# Patient Record
Sex: FEMALE
[De-identification: names, ages, dates, MRNs, and addresses within clinical notes are randomized per-mention and may not be internally consistent; named-entity substitution may affect disease eponyms.]

---

## 2022-05-31 ENCOUNTER — APPOINTMENT (OUTPATIENT)
Dept: OTOLARYNGOLOGY | Facility: CLINIC | Age: 77
End: 2022-05-31
Payer: MEDICARE

## 2022-05-31 VITALS
SYSTOLIC BLOOD PRESSURE: 154 MMHG | DIASTOLIC BLOOD PRESSURE: 98 MMHG | BODY MASS INDEX: 31.65 KG/M2 | WEIGHT: 190 LBS | TEMPERATURE: 97.6 F | HEIGHT: 65 IN | HEART RATE: 68 BPM

## 2022-05-31 DIAGNOSIS — Z78.9 OTHER SPECIFIED HEALTH STATUS: ICD-10-CM

## 2022-05-31 DIAGNOSIS — Z87.891 PERSONAL HISTORY OF NICOTINE DEPENDENCE: ICD-10-CM

## 2022-05-31 DIAGNOSIS — Z82.3 FAMILY HISTORY OF STROKE: ICD-10-CM

## 2022-05-31 DIAGNOSIS — Z82.49 FAMILY HISTORY OF ISCHEMIC HEART DISEASE AND OTHER DISEASES OF THE CIRCULATORY SYSTEM: ICD-10-CM

## 2022-05-31 PROCEDURE — 31575 DIAGNOSTIC LARYNGOSCOPY: CPT

## 2022-05-31 PROCEDURE — 99204 OFFICE O/P NEW MOD 45 MIN: CPT | Mod: 25

## 2022-05-31 RX ORDER — FLUTICASONE PROPIONATE 50 UG/1
50 SPRAY, METERED NASAL DAILY
Qty: 1 | Refills: 5 | Status: ACTIVE | COMMUNITY
Start: 2022-05-31 | End: 1900-01-01

## 2022-05-31 RX ORDER — CLINDAMYCIN PHOSPHATE 10 MG/ML
1 SOLUTION TOPICAL
Qty: 30 | Refills: 0 | Status: ACTIVE | COMMUNITY
Start: 2021-12-06

## 2022-05-31 RX ORDER — FAMOTIDINE 20 MG/1
20 TABLET, FILM COATED ORAL TWICE DAILY
Qty: 60 | Refills: 4 | Status: ACTIVE | COMMUNITY
Start: 2022-05-31 | End: 1900-01-01

## 2022-05-31 RX ORDER — POLYETHYLENE GLYCOL 3350 17 G/17G
17 POWDER, FOR SOLUTION ORAL
Qty: 238 | Refills: 0 | Status: ACTIVE | COMMUNITY
Start: 2022-04-08

## 2022-05-31 NOTE — REVIEW OF SYSTEMS
[Hoarseness] : hoarseness [Throat Clearing] : throat clearing [Throat Dryness] : throat dryness [Throat Itching] : throat itching [Negative] : Heme/Lymph [Patient Intake Form Reviewed] : Patient intake form was reviewed

## 2022-05-31 NOTE — HISTORY OF PRESENT ILLNESS
[de-identified] : past year co raspy voice, occasionall better\par no excessive voice use, no dysphagia no reflux\par co nasal congestion and pnd excessive throat clearing\par neg tobac

## 2022-05-31 NOTE — PROCEDURE
[de-identified] : Indication for procedure:  Unable to examine laryngeal structures with mirror exam, persistent hoarseness\par The patient has\par \par Scope # \par Topical anesthesia with viscous xylocaine 2% is applied to the anterior nares.\par A flexible fiberoptic laryngoscope is than introduced through the nares.\par The nasopharynx is clear without mass or inflammation.\par The posterior pharyngeal wall is unremarkable.\par The tongue base and vallecula are unremarkable.  The hypopharynx id clear and unobstructed.\par The supraglottic larynx is within normal limits.\par Both vocal cords are fully mobile with mild diffuse bilateral vocal cord edema.  \par No Nodules or other lesions are noted.\par There is no edema or erythema overlying the arytenoid cartilages or the inter-arytenoid space.\par The subglottic space is clear.\par The voice has a mild raspy quality.\par \par

## 2022-05-31 NOTE — ASSESSMENT
[FreeTextEntry1] : pnd rhinitis\par mild vc hyperplasia\par suspect laryngea reflux\par fluticasone \par famotidine 20 bid\par Reviewed with patient source of throat discomfort, cough and throat clearing related to gastric reflux entering throat and back of larynx.\par Reviewed with patient reflux diet handout with lifestyle and dietary suggestions to reduce reflux.\par fu 4 weeks\par \par \par

## 2022-06-29 ENCOUNTER — APPOINTMENT (OUTPATIENT)
Dept: OTOLARYNGOLOGY | Facility: CLINIC | Age: 77
End: 2022-06-29

## 2022-06-29 VITALS — TEMPERATURE: 97.2 F | HEIGHT: 65 IN | BODY MASS INDEX: 31.65 KG/M2 | WEIGHT: 190 LBS

## 2022-06-29 PROCEDURE — 99214 OFFICE O/P EST MOD 30 MIN: CPT | Mod: 25

## 2022-06-29 RX ORDER — IPRATROPIUM BROMIDE 42 UG/1
0.06 SPRAY NASAL 3 TIMES DAILY
Qty: 1 | Refills: 6 | Status: ACTIVE | COMMUNITY
Start: 2022-06-29 | End: 1900-01-01

## 2022-06-29 NOTE — ASSESSMENT
[FreeTextEntry1] : pnd chronic\par hyperplastic laryngitis\par ?laryngeal reflux chronic\par change to ipratropium spray\par pantoprazole 20 bid\par fu 4 weeks\par consider rf turbinates

## 2022-06-29 NOTE — REVIEW OF SYSTEMS
[Throat Clearing] : throat clearing [Negative] : Heme/Lymph [Patient Intake Form Reviewed] : Patient intake form was reviewed

## 2022-06-29 NOTE — HISTORY OF PRESENT ILLNESS
[de-identified] : co raspy voice\par fluticasone helping w nasal obstruction but not drip\par famotidine 20 bid\par still w raspy voice

## 2022-08-09 ENCOUNTER — APPOINTMENT (OUTPATIENT)
Dept: OTOLARYNGOLOGY | Facility: CLINIC | Age: 77
End: 2022-08-09

## 2022-08-09 VITALS — HEIGHT: 65 IN | WEIGHT: 190 LBS | TEMPERATURE: 97.3 F | BODY MASS INDEX: 31.65 KG/M2

## 2022-08-09 DIAGNOSIS — Z00.00 ENCOUNTER FOR GENERAL ADULT MEDICAL EXAMINATION W/OUT ABNORMAL FINDINGS: ICD-10-CM

## 2022-08-09 PROCEDURE — 99213 OFFICE O/P EST LOW 20 MIN: CPT | Mod: 25

## 2022-08-09 PROCEDURE — 31575 DIAGNOSTIC LARYNGOSCOPY: CPT

## 2022-08-09 RX ORDER — IPRATROPIUM BROMIDE 42 UG/1
0.06 SPRAY NASAL 3 TIMES DAILY
Qty: 1 | Refills: 5 | Status: ACTIVE | COMMUNITY
Start: 2022-08-09 | End: 1900-01-01

## 2022-08-09 RX ORDER — MELOXICAM 15 MG/1
15 TABLET ORAL
Qty: 30 | Refills: 0 | Status: ACTIVE | COMMUNITY
Start: 2022-08-01

## 2022-08-09 NOTE — HISTORY OF PRESENT ILLNESS
[de-identified] : fu re raspy voice\par some improvement\par using pantoprazole 20 bid and ipratropium sprayco lump sensation throat

## 2022-08-09 NOTE — ASSESSMENT
[FreeTextEntry1] : laryngeal reflux\par improved\par Reviewed with patient source of throat discomfort, cough and throat clearing related to gastric reflux entering throat and back of larynx.\par Reviewed with patient reflux diet handout with lifestyle and dietary suggestions to reduce reflux.\par continue pantoprazole 20 bid and ipratropium\par fu 4 mo

## 2022-08-09 NOTE — PROCEDURE
[de-identified] : Indication for procedure:Unable to examine laryngeal structures with mirror exam.  Difficulty w persistent throat discomfort and excessive\par throat clearing\par The patient has intermittent raspy voice\par \par Scope # 44\par Topical anesthesia with viscous xylocaine 2% is applied to the anterior nares.\par A flexible fiberoptic laryngoscope is than introduced through the nares.\par The nasopharynx is clear without mass or inflammation.\par The posterior pharyngeal wall is unremarkable.\par The tongue base and vallecula are unremarkable.  The hypopharynx is unremarkable and unobstructed.\par The supraglottic larynx is within normal limits.\par Both vocal cords are fully mobile with no nodule, polyp or other lesion present.\par There is no edema or erythema overlying the arytenoid cartilages or the inter-arytenoid space.\par The subglottic space is clear.\par The voice has a normal quality.\par

## 2022-12-06 ENCOUNTER — APPOINTMENT (OUTPATIENT)
Dept: OTOLARYNGOLOGY | Facility: CLINIC | Age: 77
End: 2022-12-06

## 2022-12-06 VITALS — WEIGHT: 192 LBS | HEIGHT: 65 IN | TEMPERATURE: 97.3 F | BODY MASS INDEX: 31.99 KG/M2

## 2022-12-06 PROCEDURE — 31575 DIAGNOSTIC LARYNGOSCOPY: CPT

## 2022-12-06 PROCEDURE — 99213 OFFICE O/P EST LOW 20 MIN: CPT | Mod: 25

## 2022-12-06 NOTE — PROCEDURE
[de-identified] : Indication for procedure:  Unable to examine laryngeal structures with mirror exam, persistent hoarseness\par The patient has raaspy voice\par \par Scope # 99\par Topical anesthesia with viscous xylocaine 2% is applied to the anterior nares.\par A flexible fiberoptic laryngoscope is than introduced through the nares.\par The nasopharynx is clear without mass or inflammation.\par The posterior pharyngeal wall is unremarkable.\par The tongue base and vallecula are unremarkable.  The hypopharynx id clear and unobstructed.\par The supraglottic larynx is within normal limits.\par Both vocal cords are fully mobile with minimal bilateral vocal cord edema.  \par No Nodules or other lesions are noted.\par There is no edema or erythema overlying the arytenoid cartilages or the inter-arytenoid space.\par The subglottic space is clear.\par The voice has a mild  raspy quality.\par

## 2022-12-06 NOTE — HISTORY OF PRESENT ILLNESS
[de-identified] : co raspy voice and weaker w excessive talking\par no dyphagia\par using pantoprazole 20 bid\par using ipratropium for pnd\par some anterior nasal dryness

## 2022-12-06 NOTE — CONSULT LETTER
[Consult Letter:] : I had the pleasure of evaluating your patient, [unfilled]. [Please see my note below.] : Please see my note below. [Consult Closing:] : Thank you very much for allowing me to participate in the care of this patient.  If you have any questions, please do not hesitate to contact me. [Sincerely,] : Sincerely, [FreeTextEntry1] : Dear Dr. SUE QUISPE,\par \par Thank you for your kind referral. Please refer to my enclosed office notes for DENZEL HERNANDEZ . If there are any questions free to contact me.\par  [FreeTextEntry3] : Vincent Davis MD, FACS\par

## 2022-12-06 NOTE — REASON FOR VISIT
[Subsequent Evaluation] : a subsequent evaluation for [Hoarseness/Dysphonia] : hoarseness [FreeTextEntry2] : voice

## 2022-12-06 NOTE — ASSESSMENT
[FreeTextEntry1] : mild hyperplastic laryngitis\par continue pantoprazole 20 bid\par ipratropium spray\par trial musinex for thick pharyngeal mucous\par fu 6 mo

## 2023-02-13 RX ORDER — PANTOPRAZOLE 20 MG/1
20 TABLET, DELAYED RELEASE ORAL TWICE DAILY
Qty: 60 | Refills: 3 | Status: ACTIVE | COMMUNITY
Start: 2022-06-29 | End: 1900-01-01

## 2023-06-14 ENCOUNTER — APPOINTMENT (OUTPATIENT)
Dept: OTOLARYNGOLOGY | Facility: CLINIC | Age: 78
End: 2023-06-14
Payer: MEDICARE

## 2023-06-14 VITALS — TEMPERATURE: 97 F | HEIGHT: 65 IN | BODY MASS INDEX: 31.32 KG/M2 | WEIGHT: 188 LBS

## 2023-06-14 DIAGNOSIS — R09.82 POSTNASAL DRIP: ICD-10-CM

## 2023-06-14 DIAGNOSIS — K21.9 GASTRO-ESOPHAGEAL REFLUX DISEASE W/OUT ESOPHAGITIS: ICD-10-CM

## 2023-06-14 DIAGNOSIS — J37.0 CHRONIC LARYNGITIS: ICD-10-CM

## 2023-06-14 DIAGNOSIS — J31.0 CHRONIC RHINITIS: ICD-10-CM

## 2023-06-14 PROCEDURE — 99213 OFFICE O/P EST LOW 20 MIN: CPT | Mod: 25

## 2023-06-14 PROCEDURE — 31575 DIAGNOSTIC LARYNGOSCOPY: CPT

## 2023-06-14 NOTE — HISTORY OF PRESENT ILLNESS
[de-identified] : recent uri and cough\par co pnd\par better now\par voice still weak and raspy\par using ipratropium spray daily pantoprazole\par musiniex prn

## 2023-06-14 NOTE — ASSESSMENT
[FreeTextEntry1] : exam unremarkable\par taking pantoprazole 20 q hs\par ipratropium\par hydration, musinex\par fu prn

## 2023-06-14 NOTE — PROCEDURE
[de-identified] : Indication for procedure:Unable to examine laryngeal structures with mirror exam.  Difficulty w persistent throat discomfort and excessive\par throat clearing\par The patient has raspy voice\par \par Scope # 1\par Topical anesthesia with viscous xylocaine 2% is applied to the anterior nares.\par A flexible fiberoptic laryngoscope is than introduced through the nares.\par The nasopharynx is clear without mass or inflammation.\par The posterior pharyngeal wall is unremarkable.\par The tongue base and vallecula are unremarkable.  The hypopharynx is unremarkable and unobstructed.\par The supraglottic larynx is within normal limits.\par Both vocal cords are fully mobile with no nodule, polyp or other lesion present.\par There is no edema or erythema overlying the arytenoid cartilages or the inter-arytenoid space.\par The subglottic space is clear.\par The voice has a normal quality.\par